# Patient Record
Sex: FEMALE | Race: WHITE
[De-identification: names, ages, dates, MRNs, and addresses within clinical notes are randomized per-mention and may not be internally consistent; named-entity substitution may affect disease eponyms.]

---

## 2017-03-29 ENCOUNTER — HOSPITAL ENCOUNTER (OUTPATIENT)
Dept: HOSPITAL 45 - C.MAMM | Age: 72
Discharge: HOME | End: 2017-03-29
Attending: OBSTETRICS & GYNECOLOGY
Payer: COMMERCIAL

## 2017-03-29 DIAGNOSIS — Z12.31: Primary | ICD-10-CM

## 2017-03-29 NOTE — MAMMOGRAPHY REPORT
BILATERAL DIGITAL SCREENING MAMMOGRAM WITH CAD: 3/29/2017



TECHNIQUE:  Current study was also evaluated with a Computer Aided Detection (CAD) system.  Bilatera
l CC and MLO views were obtained.  



COMPARISON: Comparison is made to exams dated:  3/14/2016 mammogram, 2/20/2014 mammogram, 3/2/2015 m
ammogram, 2/26/2014 mammogram, 2/6/2013 mammogram, and 2/3/2012 mammogram - [facility]
nter.   



BREAST COMPOSITION:  There are scattered areas of fibroglandular density in both breasts.  



FINDINGS:  No suspicious masses, calcifications, or areas of architectural distortion are noted in e
ither breast. There has been no significant interval change compared to prior exams.  Bilateral vitaly
gn-appearing calcifications are not significantly changed.  Asymmetry seen within the left lateral b
reast is stable compared to prior exams including the 2008 exam.





IMPRESSION:  ACR BI-RADS CATEGORY 2: BENIGN

There is no mammographic evidence of malignancy. A 1 year screening mammogram is recommended.  The p
atient will receive written notification of the results.  





Approximately 10% of breast cancers are not detected with mammography. A negative mammographic repor
t should not delay biopsy if a clinically suggestive mass is present.



Portia Rob M.D.          

ah/:3/29/2017 12:58:01  



Imaging Technologist: Elisabeth AYON(ADELINE)(M), [facility]

letter sent: Normal 1/2  

BI-RADS Code: ACR BI-RADS Category 2: Benign

## 2017-04-27 ENCOUNTER — HOSPITAL ENCOUNTER (OUTPATIENT)
Dept: HOSPITAL 45 - C.MAMM | Age: 72
Discharge: HOME | End: 2017-04-27
Attending: FAMILY MEDICINE
Payer: COMMERCIAL

## 2017-04-27 DIAGNOSIS — M85.88: Primary | ICD-10-CM

## 2017-05-18 ENCOUNTER — HOSPITAL ENCOUNTER (OUTPATIENT)
Dept: HOSPITAL 45 - C.RAD | Age: 72
Discharge: HOME | End: 2017-05-18
Attending: FAMILY MEDICINE
Payer: COMMERCIAL

## 2017-05-18 DIAGNOSIS — K44.9: ICD-10-CM

## 2017-05-18 DIAGNOSIS — I51.7: ICD-10-CM

## 2017-05-18 DIAGNOSIS — R06.02: Primary | ICD-10-CM

## 2017-05-18 NOTE — DIAGNOSTIC IMAGING REPORT
CHEST 2 VIEWS ROUTINE



CLINICAL HISTORY: SHORTNESS OF BREATH R06.02    



COMPARISON STUDY:  1/11/2016



FINDINGS: There is a thoracolumbar scoliosis. The heart remains enlarged. There

is a retrocardiac opacity consistent with a hiatal hernia. There is no failure.

There is no focal pulmonary consolidation. No significant pleural effusions are

visualized.[ 



IMPRESSION: 

1. Cardiomegaly and hiatal hernia

2. No active disease in the chest.







Electronically signed by:  Everardo Harding M.D.

5/18/2017 11:30 AM



Dictated Date/Time:  5/18/2017 11:29 AM

## 2017-05-19 ENCOUNTER — HOSPITAL ENCOUNTER (OUTPATIENT)
Dept: HOSPITAL 45 - C.CTS | Age: 72
Discharge: HOME | End: 2017-05-19
Attending: FAMILY MEDICINE
Payer: COMMERCIAL

## 2017-05-19 DIAGNOSIS — R91.1: ICD-10-CM

## 2017-05-19 DIAGNOSIS — R79.1: ICD-10-CM

## 2017-05-19 DIAGNOSIS — I51.7: ICD-10-CM

## 2017-05-19 DIAGNOSIS — R06.02: Primary | ICD-10-CM

## 2017-05-19 DIAGNOSIS — K44.9: ICD-10-CM

## 2017-05-19 DIAGNOSIS — J43.9: ICD-10-CM

## 2017-05-19 NOTE — DIAGNOSTIC IMAGING REPORT
Venous Doppler left leg LEFT VENOUS DOPP LOWER EXT UNILAT



CLINICAL HISTORY: PAIN IN LEFT LEG pain



TECHNIQUE: Venous Doppler



COMPARISON STUDY:  None



FINDINGS: Normal study



IMPRESSION:  Normal study 









Electronically signed by:  Richard Rae M.D.

5/19/2017 3:14 PM



Dictated Date/Time:  5/19/2017 3:14 PM

## 2017-05-19 NOTE — DIAGNOSTIC IMAGING REPORT
CT ANGIOGRAM OF THE CHEST



CLINICAL HISTORY: Dyspnea.



COMPARISON STUDY:  Chest x-ray dated 5/18/2017.



TECHNIQUE: Following the IV administration of 86 cc of Optiray 320, CT angiogram

of the chest was performed from the upper abdomen to the thoracic inlet

utilizing the pulmonary embolus protocol. Images are reviewed in the axial,

sagittal, and coronal planes. 3-D MIPS images are created and assessed. IV

contrast was administered without complication. The examination is modestly

degraded by motion artifact.



CT DOSE: 184.51 mGy.cm



FINDINGS:



Thyroid: Imaged portions of the thyroid gland are normal in size and

attenuation. A 9 mm peripherally calcified nodule is seen in the left lobe.



Thoracic aorta: There is mild atherosclerotic calcification of the thoracic

aorta, which is normal in caliber and demonstrates standard 3-vessel arch

anatomy. No dissection is seen.



Pulmonary vasculature: The pulmonary trunk is normal in caliber. There are no

filling defects identified in main, lobar, or segmental pulmonary branches to

suggest pulmonary embolus.



Heart: The heart is enlarged and without pericardial effusion.



Lungs and pleural spaces: Emphysematous change is noted. There is no airspace

consolidation or pleural effusion. The trachea and central airways are clear.

Atelectasis versus scarring is noted at the medial left lung base. There is a 5

mm right upper lobe nodule seen on image #182.



Mediastinum: There is no mediastinal lymphadenopathy.



Leticia: Clear.



Axillae: There is no axillary lymphadenopathy.



Upper abdomen: There is a moderate to large hiatal hernia. Partially visualized

upper abdominal viscera is otherwise within normal limits.



Skeletal structures: The skeletal structures are osteopenic. Degenerative change

and scoliosis are seen in the thoracic spine. Arthritic change is also present

in the shoulders. No lytic or blastic bony lesions are seen. Hemangiomas are

seen in the bodies of T3 and T12.





IMPRESSION:



1. There is no evidence of pulmonary embolus in the main, lobar, or segmental

pulmonary arteries.



2. Cardiomegaly and emphysema.



3. Moderate to large hiatal hernia.



4. There is no airspace consolidation or pleural effusion.



5. There is a 5 mm right apical pulmonary nodule. This can be followed as per

the Fleischner criteria. See below.











Please refer to below summary of Fleischner criteria recommendations for

follow-up of incidental CT nodules (DAYNA Young, Guidelines for management of

small pulmonary nodules detected on CT scans:  A statement from the Fleischner

Society, Radiology [phone]: [phone].)



SOLID NODULES



Solitary nodule size: <6 mm

*  low risk patients:  no follow-up needed

*  high risk patients:  optional CT at 12 months



Solitary nodule size:  6-8 mm

*  low risk patients:  follow-up at 6-12 months, then consider further follow-up

at 18-24 months

*  high risk patients:  initial follow-up CT at 6-12 months and then at 18-24

months if no change



Solitary nodule size: >8 mm

*  either low or high risk patients - consider follow-up CT at 3 months, and/or

CT-PET, and/or biopsy



Multiple nodules size:  <6 mm

*  low risk patients:  no routine follow-up

*  high risk patients:  optional CT at 12 months



Multiple nodules size:  6-8 mm

*  low risk patients:  follow-up at 3-6 months, then consider further follow-up

at 18-24 months

*  high risk patients:  follow-up at 3-6 months, then at 18-24 months if no

change



Multiple nodules size:  >8 mm

*  low risk patients:  follow-up at 3-6 months, then consider further follow-up

at 18-24 months

*  high risk patients:  follow-up at 3-6 months, then at 18-24 months if no

change



Note:  newly detected indeterminate nodule in persons 35 years of age or older.

*  low risk patients:  minimal or absent history of smoking and/or other known

risk factors

*  high risk patients:  history of smoking or of other known risk factors (e.g.

first degree relative with lung cancer, or exposure to asbestos, radon, uranium)

*  if a nodule up to 8 mm is partly solid or is ground glass further follow-up

is required after 24 months to exclude possible slow growing adenocarcinoma

(EDDY)



SUBSOLID NODULES



Solitary pure ground-glass nodule

*  nodule size <6 mm - no CT follow-up required

*  nodule size >=6 mm - follow-up CT at 6-12 months, then every 2 years until 5

years



Solitary part-solid nodule

*  nodule size <6 mm - no CT follow-up required

*  nodule size >=6 mm - follow-up CT at 3-6 months.  If unchanged, and solid

component remains <6 mm, then annual follow-up for 5 years



Multiple subsolid nodules

*  nodule size <6 mm - follow-up CT at 3-6 months, consider further follow-up at

2 and 4 years if stable

*  nodule size >=6 mm - follow-up CT at 3-6 months, subsequent management based

on the most suspicious nodule(s)





       







Electronically signed by:  Larry Harris M.D.

5/19/2017 1:13 PM



Dictated Date/Time:  5/19/2017 1:07 PM

## 2017-07-11 ENCOUNTER — HOSPITAL ENCOUNTER (OUTPATIENT)
Dept: HOSPITAL 45 - C.ULTR | Age: 72
Discharge: HOME | End: 2017-07-11
Attending: FAMILY MEDICINE
Payer: COMMERCIAL

## 2017-07-11 DIAGNOSIS — E04.1: Primary | ICD-10-CM

## 2017-07-11 NOTE — DIAGNOSTIC IMAGING REPORT
ULTRASOUND OF THE THYROID GLAND



CLINICAL HISTORY: Thyroid nodule.



COMPARISON STUDY: Chest CT dated 5/19/2017.



TECHNIQUE: Real-time, grayscale, and color flow sonography of the thyroid gland

is performed utilizing a high-frequency linear transducer. Images are reviewed

in the transverse and longitudinal planes.



FINDINGS:



Right lobe: The right lobe of the thyroid gland is normal in size and

heterogeneous in echotexture, measuring 4.4 x 1.7 x 1.1 cm.



Left lobe: The left lobe of the thyroid gland is normal in size and

heterogeneous in echotexture, measuring 4.0 x 1.6 x 1.3 cm. There is a densely

calcified nodule in the lower pole measuring 1.0 x 0.6 x 0.8 cm. A hypoechoic

nodule in the midpole measures 1.0 x 0.8 x 0.8 cm, and a hypoechoic nodule in

the lower pole measures 0.6 x 0.5 x 0.5 cm.



Isthmus: The thyroid isthmus is normal in appearance and measures 0.4 cm in AP

diameter.





IMPRESSION: 



1. The thyroid gland is heterogeneous in echotexture. Correlation with serum

thyroid function studies is recommended.



2. There are 3 nodules in the left lobe of the thyroid gland measuring up to 1.0

cm. These do not meet sonographic criteria for fine-needle aspiration.

Precautionary follow-up examination in one years time is recommended.







Electronically signed by:  Larry Harris M.D.

7/11/2017 3:25 PM



Dictated Date/Time:  7/11/2017 3:23 PM

## 2017-12-11 ENCOUNTER — HOSPITAL ENCOUNTER (OUTPATIENT)
Dept: HOSPITAL 45 - C.ULTR | Age: 72
Discharge: HOME | End: 2017-12-11
Attending: FAMILY MEDICINE
Payer: COMMERCIAL

## 2017-12-11 DIAGNOSIS — E04.1: Primary | ICD-10-CM

## 2017-12-11 NOTE — DIAGNOSTIC IMAGING REPORT
SOFT TISS HEAD/NECK-THYROID



HISTORY: Thyroid nodule  THYROID NODULE



COMPARISON:  7/11/2017



FINDINGS:



Right lobe:  Maximum dimension 3.3 cm. Slightly heterogeneously internal

architecture.



Left lobe:  Mildly heterogeneous. 3 nodules unchanged in the prior exam. These

do not currently exceed 9 mm.



Isthmus:  No nodules.



IMPRESSION:  

Unchanged exam from the prior study. Low suspicion nodularity left lobe stable.

1 year follow-up is suggested. 









The above report was generated using voice recognition software.  It may contain

grammatical, syntax or spelling errors.







Electronically signed by:  Richard Rae M.D.

12/11/2017 10:59 AM



Dictated Date/Time:  12/11/2017 10:55 AM

## 2018-04-05 ENCOUNTER — HOSPITAL ENCOUNTER (OUTPATIENT)
Dept: HOSPITAL 45 - C.MAMM | Age: 73
Discharge: HOME | End: 2018-04-05
Attending: FAMILY MEDICINE
Payer: COMMERCIAL

## 2018-04-05 DIAGNOSIS — Z12.31: Primary | ICD-10-CM

## 2018-04-05 NOTE — MAMMOGRAPHY REPORT
BILATERAL DIGITAL SCREENING MAMMOGRAM TOMOSYNTHESIS WITH CAD: 4/5/2018

CLINICAL HISTORY: Routine screening.  





TECHNIQUE:  Breast tomosynthesis in addition to standard 2D mammography was performed. Current study 
was also evaluated with a Computer Aided Detection (CAD) system.  



COMPARISON: Comparison is made to exams dated:  3/29/2017 mammogram, 3/14/2016 mammogram, 3/2/2015 ma
mmogram, 2/20/2014 mammogram, 2/6/2013 mammogram, and 2/3/2012 mammogram - [facility]
er.   



BREAST COMPOSITION:  There are scattered areas of fibroglandular density in both breasts.  



FINDINGS:  No suspicious masses, calcifications, or areas of architectural distortion are noted in ei
ther breast. There has been no significant interval change compared to prior exams. Scattered bilater
al benign-appearing calcifications are not significantly changed.  





IMPRESSION:  ACR BI-RADS CATEGORY 2: BENIGN

There is no mammographic evidence of malignancy. A 1 year screening mammogram is recommended.  The pa
tient will receive written notification of the results.  





Approximately 10% of breast cancers are not detected with mammography. A negative mammographic report
 should not delay biopsy if a clinically suggestive mass is present.



Portia Rob M.D.          

ah/:4/5/2018 10:32:43  



Imaging Technologist: Crys AYON(ADELINE)(RONIT), [facility]

letter sent: Normal 1/2  

BI-RADS Code: ACR BI-RADS Category 2: Benign

## 2018-08-02 ENCOUNTER — HOSPITAL ENCOUNTER (OUTPATIENT)
Dept: HOSPITAL 45 - C.PATHSPEC | Age: 73
Discharge: HOME | End: 2018-08-02
Attending: SURGERY
Payer: COMMERCIAL

## 2018-08-02 DIAGNOSIS — L98.9: Primary | ICD-10-CM

## 2018-08-03 NOTE — DIAGNOSTIC IMAGING REPORT
SCOLIOSIS AP   LATERAL



CLINICAL HISTORY: SCOLIOSIS    



COMPARISON STUDY:  8/4/2015



FINDINGS: There is a thoracolumbar levoscoliosis of 30 degrees. This degree of

spinal curvature remains similar to the preceding study.



IMPRESSION:  Thoracolumbar levoscoliosis of 30 degrees. 









Electronically signed by:  Everardo Harding M.D.

8/3/2018 2:47 PM



Dictated Date/Time:  8/3/2018 11:21 AM

## 2018-08-06 ENCOUNTER — HOSPITAL ENCOUNTER (OUTPATIENT)
Dept: HOSPITAL 45 - C.RDSM | Age: 73
Discharge: HOME | End: 2018-08-06
Payer: COMMERCIAL

## 2018-08-06 DIAGNOSIS — M41.85: ICD-10-CM

## 2018-08-06 DIAGNOSIS — M41.9: Primary | ICD-10-CM
